# Patient Record
(demographics unavailable — no encounter records)

---

## 2024-11-26 NOTE — REASON FOR VISIT
[Subsequent Evaluation] : a subsequent evaluation for [Nasal Obstruction] : nasal obstruction [FreeTextEntry2] : nasal congestion

## 2024-11-26 NOTE — PROCEDURE
[FreeTextEntry6] : Procedure performed: Nasal Endoscopy- Diagnostic Pre-op indication(s): nasal congestion Post-op indication(s): nasal congestion  Verbal and/or written consent obtained from patient Anterior rhinoscopy insufficient to account for symptoms Scope #: 3,  flexible fiber optic telescope  The scope was introduced in the nasal passage between the middle and inferior turbinates to exam the inferior portion of the middle meatus and the fontanelle, as well as the maxillary ostia.  Next, the scope was passed medically and posteriorly to the middle turbinates to examine the sphenoethmoid recess and the superior turbinate region. Upon visualization the finders are as follows: Nasal Septum: global L septal deviation  Bilateral - Mucosa: boggy turbinates, Mucous: scant, Polyp: not seen, Inferior Turbinate: boggy, Middle Turbinate: normal, Superior Turbinate: normal, Inferior Meatus: narrow, Middle Meatus: narrow, Super Meatus:normal, Sphenoethmoidal Recess: clear narrow on L side

## 2024-11-26 NOTE — HISTORY OF PRESENT ILLNESS
[de-identified] : 31 y/o M pt with nasal congestion \par  all year around, worse in the spring, b/l\par  + runny nose\par  no sinus pressure/ infection \par  tried flonase about a year ago, helped for few min \par  no Hx allergy testing\par  does not take any allergy meds, does get itchy and sneezy \par   [FreeTextEntry1] : Patient is following up for evaluation of stuffy nose, states wants to discuss surgical options. Has tried nasal sprays but nothing helps. Denies sinus pressure or sinus infections that he knows of. States his nose is stuffed all year around, has been allergy tested. States constantly breaths from mouth.

## 2024-11-26 NOTE — PHYSICAL EXAM
[Nasal Endoscopy Performed] : nasal endoscopy was performed, see procedure section for findings [Normal] : no abnormal secretions [de-identified] : slight L sided C deformity, +mary, poor tip support, underrotated, dorsal over projection

## 2024-11-26 NOTE — ASSESSMENT
[FreeTextEntry1] : 33 y/o M pt with nasal congestion and runny nose. On exam, BITH, narrow on L side, slight L sided C deformity, +mary, left caudal deflection, global left septal deviation, poor tip support, underrotated, dorsal over projection.  Discussed options: 1) continue conservative management with nasal sprays and washes, allergy avoidance, breathe right strips  2) vivaer procedure, swell body reduction and bilateral inferior turbinate reduction 3) septoplasty, turbinate reduction, valve repair  - patient elected for option 2, will book for procedure  - Allergy test performed and discussed 5/22/23. Counseled patient at length on pathophysiology all allergies and techniques for avoidance. handouts given.  Risks benefits and alternatives to Vivaer nasal valve repair, swell body reduction and bilateral inferior turbinate reduction discussed. Risks of bleeding, infection, septal hematoma, injury to the skull base, septal perforation results in whistling, permanent numbness in and around their nose, pain, discoloration or swelling that may persist, scarring crusting and bleeding as well as continued nasal obstruction, empty nose syndrome, cosmetic deformity, uneven-looking nose, collapse, scarring, synechiae,  and need for revisionary surgery, recurrent or persistent nasal deformity. Patient understood risks and would like to continue with the operation.  Risks benefits and alternatives to septonasal reconstruction and bilateral inferior turbinate reduction discussed. Risks of bleeding, infection, septal hematoma, injury to the skull base, septal perforation results in whistling, permanent numbness in and around their nose, pain, discoloration or swelling that may persist, scarring crusting and bleeding as well as continued nasal obstruction, empty nose syndrome, cosmetic deformity, uneven-looking nose, collapse, scarring, synechiae, pollybeak deformity, rocker deformity, bone comminution, and need for revisionary surgery (patient explained that there is inherent revision rate to septorhinoplasty), osteitis, bleeding, infection, recurrent or persistent nasal deformity. Patient understood risks and would like to continue with the operation. Offered option of cosmetic reconstruction. patient  did not want to pay extra for the cosmetic piece and the concern is breathing did not want to do sleeping procedure, discussed would be limited response without addressing the septum. he understands but wanted to see if would be enough/ get improvement

## 2024-11-26 NOTE — END OF VISIT
[FreeTextEntry3] : I personally saw and examined  the patient in detail.  I spoke to SRIRAM Sorensen regarding the assessment and plan of care. I performed the procedures and relevant physical exam.  I have reviewed the above assessment and plan of care and I agree.  I have made changes to the body of the note wherever necessary and appropriate